# Patient Record
Sex: FEMALE | Race: WHITE | Employment: FULL TIME | URBAN - METROPOLITAN AREA
[De-identification: names, ages, dates, MRNs, and addresses within clinical notes are randomized per-mention and may not be internally consistent; named-entity substitution may affect disease eponyms.]

---

## 2017-01-13 LAB
AMPHETAMINE SCREEN, URINE: NORMAL
BARBITURATE SCREEN URINE: NORMAL
BENZODIAZEPINE SCREEN, URINE: NORMAL
CANNABINOID SCREEN URINE: NORMAL
COCAINE METABOLITE SCREEN URINE: NORMAL
Lab: NORMAL
METHADONE SCREEN, URINE: NORMAL
OPIATE SCREEN URINE: NORMAL
OXYCODONE URINE: NORMAL
PH UA: 5
PHENCYCLIDINE SCREEN URINE: NORMAL
PROPOXYPHENE SCREEN: NORMAL

## 2017-01-27 LAB
GLUCOSE CHALLENGE: 111 MG/DL
HCT VFR BLD CALC: 36.4 % (ref 36–48)
HEMOGLOBIN: 11.8 G/DL (ref 12–16)
MCH RBC QN AUTO: 29 PG (ref 26–34)
MCHC RBC AUTO-ENTMCNC: 32.5 G/DL (ref 31–36)
MCV RBC AUTO: 89.3 FL (ref 80–100)
PDW BLD-RTO: 14.4 % (ref 12.4–15.4)
PLATELET # BLD: 179 K/UL (ref 135–450)
PMV BLD AUTO: 10.6 FL (ref 5–10.5)
RBC # BLD: 4.07 M/UL (ref 4–5.2)
WBC # BLD: 7.8 K/UL (ref 4–11)

## 2017-03-23 PROBLEM — Z98.891 S/P CESAREAN SECTION: Status: ACTIVE | Noted: 2017-03-23

## 2019-12-14 ENCOUNTER — APPOINTMENT (OUTPATIENT)
Dept: GENERAL RADIOLOGY | Age: 29
End: 2019-12-14
Payer: COMMERCIAL

## 2019-12-14 ENCOUNTER — HOSPITAL ENCOUNTER (EMERGENCY)
Age: 29
Discharge: HOME | End: 2019-12-14
Attending: EMERGENCY MEDICINE
Payer: COMMERCIAL

## 2019-12-14 VITALS
RESPIRATION RATE: 20 BRPM | SYSTOLIC BLOOD PRESSURE: 117 MMHG | TEMPERATURE: 98.9 F | BODY MASS INDEX: 43.03 KG/M2 | OXYGEN SATURATION: 98 % | DIASTOLIC BLOOD PRESSURE: 78 MMHG | WEIGHT: 258.6 LBS | HEART RATE: 95 BPM

## 2019-12-14 DIAGNOSIS — J20.9 ACUTE BRONCHITIS, UNSPECIFIED ORGANISM: Primary | ICD-10-CM

## 2019-12-14 DIAGNOSIS — J98.9 REACTIVE AIRWAY DISEASE THAT IS NOT ASTHMA: ICD-10-CM

## 2019-12-14 PROCEDURE — 6360000002 HC RX W HCPCS: Performed by: EMERGENCY MEDICINE

## 2019-12-14 PROCEDURE — 71046 X-RAY EXAM CHEST 2 VIEWS: CPT

## 2019-12-14 PROCEDURE — 99283 EMERGENCY DEPT VISIT LOW MDM: CPT

## 2019-12-14 RX ORDER — PREDNISONE 20 MG/1
40 TABLET ORAL DAILY
Qty: 10 TABLET | Refills: 0 | Status: SHIPPED | OUTPATIENT
Start: 2019-12-14 | End: 2019-12-19

## 2019-12-14 RX ORDER — AZITHROMYCIN 250 MG/1
250 TABLET, FILM COATED ORAL SEE ADMIN INSTRUCTIONS
Qty: 6 TABLET | Refills: 0 | Status: SHIPPED | OUTPATIENT
Start: 2019-12-14 | End: 2019-12-19

## 2019-12-14 RX ORDER — ALBUTEROL SULFATE 90 UG/1
2 AEROSOL, METERED RESPIRATORY (INHALATION) 4 TIMES DAILY PRN
Qty: 1 INHALER | Refills: 0 | Status: SHIPPED | OUTPATIENT
Start: 2019-12-14 | End: 2022-10-29

## 2019-12-14 RX ORDER — ALBUTEROL SULFATE 2.5 MG/3ML
2.5 SOLUTION RESPIRATORY (INHALATION) ONCE
Status: COMPLETED | OUTPATIENT
Start: 2019-12-14 | End: 2019-12-14

## 2019-12-14 RX ADMIN — ALBUTEROL SULFATE 2.5 MG: 2.5 SOLUTION RESPIRATORY (INHALATION) at 18:30

## 2020-07-31 ENCOUNTER — APPOINTMENT (OUTPATIENT)
Dept: GENERAL RADIOLOGY | Age: 30
End: 2020-07-31
Payer: COMMERCIAL

## 2020-07-31 ENCOUNTER — HOSPITAL ENCOUNTER (EMERGENCY)
Age: 30
Discharge: HOME | End: 2020-07-31
Attending: EMERGENCY MEDICINE
Payer: COMMERCIAL

## 2020-07-31 VITALS
TEMPERATURE: 98.9 F | HEART RATE: 81 BPM | BODY MASS INDEX: 44.98 KG/M2 | HEIGHT: 65 IN | OXYGEN SATURATION: 95 % | DIASTOLIC BLOOD PRESSURE: 89 MMHG | WEIGHT: 270 LBS | SYSTOLIC BLOOD PRESSURE: 145 MMHG | RESPIRATION RATE: 16 BRPM

## 2020-07-31 LAB
BILIRUBIN URINE: NEGATIVE
BLOOD, URINE: NEGATIVE
CLARITY: CLEAR
COLOR: YELLOW
GLUCOSE URINE: NEGATIVE MG/DL
KETONES, URINE: NEGATIVE MG/DL
LEUKOCYTE ESTERASE, URINE: NEGATIVE
MICROSCOPIC EXAMINATION: NORMAL
NITRITE, URINE: NEGATIVE
PH UA: 6 (ref 5–8)
PROTEIN UA: NEGATIVE MG/DL
S PYO AG THROAT QL: NEGATIVE
SPECIFIC GRAVITY UA: 1.01 (ref 1–1.03)
URINE REFLEX TO CULTURE: NORMAL
URINE TYPE: NORMAL
UROBILINOGEN, URINE: 0.2 E.U./DL

## 2020-07-31 PROCEDURE — 70360 X-RAY EXAM OF NECK: CPT

## 2020-07-31 PROCEDURE — 87880 STREP A ASSAY W/OPTIC: CPT

## 2020-07-31 PROCEDURE — 87081 CULTURE SCREEN ONLY: CPT

## 2020-07-31 PROCEDURE — 99284 EMERGENCY DEPT VISIT MOD MDM: CPT

## 2020-07-31 PROCEDURE — 81003 URINALYSIS AUTO W/O SCOPE: CPT

## 2020-07-31 RX ORDER — CEPHALEXIN 500 MG/1
500 CAPSULE ORAL 3 TIMES DAILY
Qty: 30 CAPSULE | Refills: 0 | Status: SHIPPED | OUTPATIENT
Start: 2020-07-31 | End: 2020-08-10

## 2020-07-31 ASSESSMENT — ENCOUNTER SYMPTOMS
NAUSEA: 0
DIARRHEA: 0
VOMITING: 0
SHORTNESS OF BREATH: 0
BACK PAIN: 0
ABDOMINAL PAIN: 0
RHINORRHEA: 0
EYE PAIN: 0
EYE DISCHARGE: 0
SORE THROAT: 1
COUGH: 0
WHEEZING: 0

## 2020-07-31 ASSESSMENT — PAIN DESCRIPTION - FREQUENCY: FREQUENCY: CONTINUOUS

## 2020-07-31 ASSESSMENT — PAIN DESCRIPTION - PAIN TYPE: TYPE: ACUTE PAIN

## 2020-07-31 ASSESSMENT — PAIN DESCRIPTION - PROGRESSION: CLINICAL_PROGRESSION: GRADUALLY WORSENING

## 2020-07-31 ASSESSMENT — PAIN SCALES - GENERAL
PAINLEVEL_OUTOF10: 4
PAINLEVEL_OUTOF10: 0
PAINLEVEL_OUTOF10: 0

## 2020-07-31 ASSESSMENT — PAIN DESCRIPTION - LOCATION: LOCATION: THROAT

## 2020-07-31 ASSESSMENT — PAIN DESCRIPTION - ONSET: ONSET: SUDDEN

## 2020-07-31 ASSESSMENT — PAIN DESCRIPTION - DESCRIPTORS: DESCRIPTORS: ACHING

## 2020-07-31 ASSESSMENT — PAIN - FUNCTIONAL ASSESSMENT: PAIN_FUNCTIONAL_ASSESSMENT: ACTIVITIES ARE NOT PREVENTED

## 2020-07-31 ASSESSMENT — PAIN DESCRIPTION - ORIENTATION: ORIENTATION: RIGHT

## 2020-07-31 NOTE — ED TRIAGE NOTES
Patient arrived ambulatory. C/o sore throat on right side X 40 mins ago. Reports she ate some beef jerky and since then feels like there might be something stuck. Sore to swallow. No respiratory distress. A/ox4. Will continue to monitor.

## 2020-07-31 NOTE — ED PROVIDER NOTES
[facility]  eMERGENCY dEPARTMENT eNCOUnter        Pt Name: Ashley Ricks  MRN: [id number]  [place] 1990  Date of evaluation: 7/31/2020  Provider: Napoleon Pedraza MD  PCP: [facility]      CHIEF COMPLAINT       Chief Complaint   Patient presents with    Pharyngitis     feels like something stuck in throat X 40 mins, hurts to swallow       HISTORY OFPRESENT ILLNESS   (Location/Symptom, Timing/Onset, Context/Setting, Quality, Duration, Modifying Factors,Severity)  Note limiting factors. Ashley Ricks is a 34 y.o. female       Location/Symptom: Foreign body sensation in her throat  Timing/Onset: 40 minutes. Context/Setting: Patient was eating some beef jerky when she developed a sensation. Quality: Complaining of a sharp pain. Duration: Again this is only been going on about an hour. Modifying Factors: She is not having any difficulty handling her secretions. There is no change in her voice. She does note that she has been very fatigued lately. She is noticed a little pain in her right ear. She is very worried that she has some type of cancer because she used to smoke. Nursing Noteswere all reviewed and agreed with or any disagreements were addressed  in the HPI. REVIEW OF SYSTEMS    (2-9 systems for level 4, 10 or more for level 5)     Review of Systems   Constitutional: Positive for fatigue. Negative for chills and fever. HENT: Positive for ear pain and sore throat. Negative for rhinorrhea. Eyes: Negative for pain, discharge and visual disturbance. Respiratory: Negative for cough, shortness of breath and wheezing. Cardiovascular: Negative for chest pain, palpitations and leg swelling. Gastrointestinal: Negative for abdominal pain, diarrhea, nausea and vomiting. Genitourinary: Negative for difficulty urinating, dysuria, pelvic pain and vaginal discharge.    Musculoskeletal: Negative for arthralgias, back pain, joint swelling and neck pain. Skin: Negative for rash. Allergic/Immunologic: Negative for environmental allergies. Neurological: Negative for dizziness, seizures, syncope and headaches. Hematological: Negative for adenopathy. Psychiatric/Behavioral: Negative for dysphoric mood and suicidal ideas. The patient is not nervous/anxious. PAST MEDICAL HISTORY     Past Medical History:   Diagnosis Date    Bacterial vaginosis 2015    Chronic kidney disease     reoccuring UTI and BV with preg.   Pylo 16    HSV-2 (herpes simplex virus 2) infection     last outbreak  did not take Valtrex at that time  taking Valtrex now    Postpartum depression     no meds         SURGICAL HISTORY     Past Surgical History:   Procedure Laterality Date     SECTION      TONSILLECTOMY      WISDOM TOOTH EXTRACTION           CURRENTMEDICATIONS       Previous Medications    ALBUTEROL SULFATE  (90 BASE) MCG/ACT INHALER    Inhale 2 puffs into the lungs 4 times daily as needed for Wheezing    LEVONORGESTREL (MIRENA, 52 MG, IU)    by Intrauterine route    SPACER/AERO-HOLDING CHAMBERS EPHRAIM    1 Device by Does not apply route daily as needed (with inhaler)       ALLERGIES     Norco [hydrocodone-acetaminophen]    FAMILY HISTORY       Family History   Problem Relation Age of Onset    Heart Disease Maternal Grandfather     Substance Abuse Maternal Grandfather         alcoholic     Heart Disease Paternal Grandfather     High Cholesterol Mother     High Blood Pressure Mother     Depression Mother     Diabetes Father     High Cholesterol Father     Arthritis Sister     Cancer Maternal Grandmother     Heart Disease Maternal Grandmother     High Blood Pressure Maternal Grandmother     Diabetes Paternal Grandmother     Heart Disease Paternal Grandmother         50's heart attack          SOCIAL HISTORY       Social History     Socioeconomic History    Marital status: Single     Spouse name: None  Number of children: None    Years of education: None    Highest education level: None   Occupational History    None   Social Needs    Financial resource strain: None    Food insecurity     Worry: None     Inability: None    Transportation needs     Medical: None     Non-medical: None   Tobacco Use    Smoking status: Current Every Day Smoker     Packs/day: 0.50     Types: Cigarettes    Smokeless tobacco: Never Used   Substance and Sexual Activity    Alcohol use: No    Drug use: No    Sexual activity: Yes     Partners: Male   Lifestyle    Physical activity     Days per week: None     Minutes per session: None    Stress: None   Relationships    Social connections     Talks on phone: None     Gets together: None     Attends [employer] service: None     Active member of club or organization: None     Attends meetings of clubs or organizations: None     Relationship status: None    Intimate partner violence     Fear of current or ex partner: None     Emotionally abused: None     Physically abused: None     Forced sexual activity: None   Other Topics Concern    None   Social History Narrative    None       SCREENINGS             PHYSICAL EXAM    (up to 7 for level 4, 8 or more for level 5)     ED Triage Vitals   BP Temp Temp Source Pulse Resp SpO2 Height Weight   07/31/20 1838 07/31/20 1838 07/31/20 1838 07/31/20 1838 07/31/20 1842 07/31/20 1838 07/31/20 1838 07/31/20 1838   (!) 145/89 98.9 °F (37.2 °C) Oral 80 16 95 % 5' 5\" (1.651 m) 270 lb (122.5 kg)      height is 5' 5\" (1.651 m) and weight is 270 lb (122.5 kg). Her oral temperature is 98.9 °F (37.2 °C). Her blood pressure is 145/89 (abnormal) and her pulse is 81. Her respiration is 16 and oxygen saturation is 95%. Physical Exam  Vitals signs and nursing note reviewed. Constitutional:       Appearance: She is well-developed. She is not diaphoretic. HENT:      Head: Normocephalic and atraumatic.       Right Ear: External ear normal.      Left Ear: External ear normal.      Ears:      Comments: There is just a little bit of erythema of the right tympanic membrane but I do not see otitis externa and I do not see an air-fluid level or obvious otitis media     Mouth/Throat:      Pharynx: Oropharynx is clear. Uvula midline. No pharyngeal swelling, oropharyngeal exudate, posterior oropharyngeal erythema or uvula swelling. Tonsils: 0 on the right. 0 on the left. Eyes:      General: No scleral icterus. Right eye: No discharge. Left eye: No discharge. Conjunctiva/sclera: Conjunctivae normal.   Neck:      Musculoskeletal: Normal range of motion. Trachea: No tracheal deviation. Pulmonary:      Effort: Pulmonary effort is normal. No respiratory distress. Breath sounds: No stridor. Musculoskeletal: Normal range of motion. Skin:     General: Skin is warm and dry. Neurological:      Mental Status: She is alert and oriented to person, place, and time.       Coordination: Coordination normal.   Psychiatric:         Behavior: Behavior normal.          DIAGNOSTIC RESULTS   LABS:    Results for orders placed or performed during the hospital encounter of 07/31/20   Strep Screen Group A Throat    Specimen: Throat   Result Value Ref Range    Rapid Strep A Screen Negative Negative   Urinalysis Reflex to Culture    Specimen: Urine, clean catch   Result Value Ref Range    Color, UA Yellow Straw/Yellow    Clarity, UA Clear Clear    Glucose, Ur Negative Negative mg/dL    Bilirubin Urine Negative Negative    Ketones, Urine Negative Negative mg/dL    Specific Gravity, UA 1.015 1.005 - 1.030    Blood, Urine Negative Negative    pH, UA 6.0 5.0 - 8.0    Protein, UA Negative Negative mg/dL    Urobilinogen, Urine 0.2 <2.0 E.U./dL    Nitrite, Urine Negative Negative    Leukocyte Esterase, Urine Negative Negative    Microscopic Examination Not Indicated     Urine Type NotGiven     Urine Reflex to Culture Not Indicated        All other labs were within normal range or not returned as of this dictation. EKG: All EKG's are interpreted by the Emergency Department Physician who either signs orCo-signs this chart in the absence of a cardiologist.    None    RADIOLOGY:   Non-plain film images such as CT, Ultrasound and MRI are read by the radiologist. Ioana Ojeda radiographic images are visualized and preliminarily interpreted by the  EDProvider with the below findings:    Xr Neck Soft Tissue    Result Date: 7/31/2020  EXAMINATION: TWO XRAY VIEWS OF THE NECK SOFT TISSUES 7/31/2020 4:17 pm COMPARISON: None. HISTORY: ORDERING SYSTEM PROVIDED HISTORY: foreign body sensation TECHNOLOGIST PROVIDED HISTORY: Reason for exam:->foreign body sensation Reason for Exam: Feels like something stuck in throat x 1 hr Acuity: Acute Mechanism of Injury: Was eating beef jerky, hurts to swallow. Relevant Medical/Surgical History: Sx tonsillectomy. FINDINGS: Prevertebral soft tissues are unremarkable. The epiglottis and aryepiglottic folds are well defined. No foreign bodies are identified. On the AP examination, the subglottic trachea is unremarkable in appearance. The visualized lungs are clear. No acute abnormality identified. No foreign body is detected. Note that beef jerky would likely not be detectable with radiography. PROCEDURES   Unless otherwise noted below, none     Procedures    CRITICAL CARE TIME   N/A    CONSULTS:  None    EMERGENCY DEPARTMENT COURSE and DIFFERENTIAL DIAGNOSIS/MDM:   Vitals:    Vitals:    07/31/20 1838 07/31/20 1842   BP: (!) 145/89 (!) 145/89   Pulse: 80 81   Resp:  16   Temp: 98.9 °F (37.2 °C) 98.9 °F (37.2 °C)   TempSrc: Oral Oral   SpO2: 95% 95%   Weight: 270 lb (122.5 kg) 270 lb (122.5 kg)   Height: 5' 5\" (1.651 m) 5' 5\" (1.651 m)       Patient was given the following medications:  Medications - No data to display    Patient had various concerns. She was worried that she had some type of cancer.   I did explain to her that the likelihood a soft tissue x-ray would show the beef jerky but I did do one just to somewhat reassure that I did not see any obvious mass. While she was here she states that she stuck her finger back behind her soft palate and did get some kind of mucoid green material that smelled bad. So, I gave her an antibiotic but told her to wait until tomorrow to see if all this goes away and that she should just try gargling to see if this comes out. Her mother wanted me to draw some blood to check vitamin levels which of course I cannot do here in the department but I did offer to do a CBC and chemistry but the patient declined. We did do a urinalysis which was normal.      FINAL IMPRESSION      1.  Foreign body sensation in throat          DISPOSITION/PLAN    DISPOSITION Decision To Discharge 07/31/2020 08:01:29 PM      PATIENT REFERRED TO:  JHOAN Elizalde [address]  [address] [place] [address] [address])  [address] [place] [address]  [phone]      This is our ENT specialist at [address] [place] [address].:  New Prescriptions    CEPHALEXIN (KEFLEX) 500 MG CAPSULE    Take 1 capsule by mouth 3 times daily for 10 days       DISCONTINUED MEDICATIONS:  Discontinued Medications    No medications on file              (Please note that portions of this note were completed with a voice recognition program.  Efforts were made to editthe dictations but occasionally words are mis-transcribed.)    Breana Greene MD (electronically signed)           Breana Greene MD  07/31/20 2008

## 2020-08-01 ENCOUNTER — CARE COORDINATION (OUTPATIENT)
Dept: CARE COORDINATION | Age: 30
End: 2020-08-01

## 2020-08-01 NOTE — ED NOTES
Pt discharged at this time. Discharge instructions and medications reviewed,  Questions were answered. PT verbalized understanding. VSS, Afebrile. Follow up appointments were discussed.          Joshua Guzman RN  07/31/20 2003

## 2020-08-03 LAB — S PYO THROAT QL CULT: NORMAL

## 2020-08-04 ENCOUNTER — TELEPHONE (OUTPATIENT)
Dept: ENT CLINIC | Age: 30
End: 2020-08-04

## 2020-08-04 NOTE — TELEPHONE ENCOUNTER
----- Message from Patti Lopez MD sent at 8/3/2020  8:28 AM EDT -----  Regarding: appointment  I would like to see in the next 2 weeks.     Thanks,  Ashley Alejandra

## 2021-01-13 ENCOUNTER — HOSPITAL ENCOUNTER (EMERGENCY)
Age: 31
Discharge: HOME | End: 2021-01-13
Attending: EMERGENCY MEDICINE
Payer: COMMERCIAL

## 2021-01-13 VITALS
OXYGEN SATURATION: 98 % | HEIGHT: 65 IN | WEIGHT: 267.6 LBS | HEART RATE: 90 BPM | SYSTOLIC BLOOD PRESSURE: 132 MMHG | RESPIRATION RATE: 14 BRPM | BODY MASS INDEX: 44.58 KG/M2 | DIASTOLIC BLOOD PRESSURE: 86 MMHG | TEMPERATURE: 98.1 F

## 2021-01-13 DIAGNOSIS — H60.332 ACUTE SWIMMER'S EAR OF LEFT SIDE: Primary | ICD-10-CM

## 2021-01-13 PROCEDURE — 99283 EMERGENCY DEPT VISIT LOW MDM: CPT

## 2021-01-13 RX ORDER — NEOMYCIN SULFATE, POLYMYXIN B SULFATE AND HYDROCORTISONE 10; 3.5; 1 MG/ML; MG/ML; [USP'U]/ML
3 SUSPENSION/ DROPS AURICULAR (OTIC) 4 TIMES DAILY
Qty: 10 ML | Refills: 0 | Status: SHIPPED | OUTPATIENT
Start: 2021-01-13 | End: 2021-01-23

## 2021-01-13 ASSESSMENT — PAIN DESCRIPTION - PAIN TYPE: TYPE: ACUTE PAIN

## 2021-01-13 ASSESSMENT — PAIN SCALES - GENERAL
PAINLEVEL_OUTOF10: 2
PAINLEVEL_OUTOF10: 2

## 2021-01-13 ASSESSMENT — PAIN DESCRIPTION - LOCATION: LOCATION: EAR

## 2021-01-13 ASSESSMENT — PAIN DESCRIPTION - ORIENTATION: ORIENTATION: LEFT

## 2021-01-13 ASSESSMENT — PAIN DESCRIPTION - FREQUENCY: FREQUENCY: CONTINUOUS

## 2021-01-13 NOTE — ED PROVIDER NOTES
[facility]  eMERGENCY dEPARTMENT eNCOUnter      Pt Name: Forest Vega  MRN: [id number]  [place] 1990  Date of evaluation: 2021  Provider: Verner Laster, MD    [address] [place] [address]       Chief Complaint   Patient presents with   Beverlie Reading     left ear ache started 2 days ago          HISTORY OF PRESENT ILLNESS  (Location/Symptom, Timing/Onset, Context/Setting, Quality, Duration, Modifying Factors, Severity.)   Forest Vega is a 27 y.o. female who presents to the emergency department complaining of left ear pain for 2 days. He states it is sore to touch. Hurts when she opens and closes her mouth. No ear drainage. No rhinorrhea, sore throat or cough. She did get her coronavirus vaccine yesterday. Her ear discomfort started the day before. Nursing Notes were reviewed and I agree. REVIEW OF SYSTEMS    (2-9 systems for level 4, 10 or more for level 5)     Dental: No fever or chills. Eyes: No redness or discharge. ENT: Left ear pain as above. No drainage. No nasal congestion or sore throat. Respiratory: No cough. Except as noted above the remainder of the review of systems was reviewed and negative. PAST MEDICAL HISTORY         Diagnosis Date    Bacterial vaginosis 2015    Chronic kidney disease     reoccuring UTI and BV with preg.   Pylo 16    HSV-2 (herpes simplex virus 2) infection     last outbreak  did not take Valtrex at that time  taking Valtrex now    Postpartum depression     no meds       SURGICAL HISTORY           Procedure Laterality Date     SECTION      TONSILLECTOMY      WISDOM TOOTH EXTRACTION         CURRENT MEDICATIONS       Previous Medications    ALBUTEROL SULFATE  (90 BASE) MCG/ACT INHALER    Inhale 2 puffs into the lungs 4 times daily as needed for Wheezing    LEVONORGESTREL (MIRENA, 52 MG, IU)    by Intrauterine route    SPACER/AERO-HOLDING CHAMBERS EPHRAIM    1 Device by Does not apply route daily as needed (with inhaler)       ALLERGIES     Norco [hydrocodone-acetaminophen]    FAMILY HISTORY           Problem Relation Age of Onset    Heart Disease Maternal Grandfather     Substance Abuse Maternal Grandfather         alcoholic     Heart Disease Paternal Grandfather     High Cholesterol Mother     High Blood Pressure Mother     Depression Mother     Diabetes Father     High Cholesterol Father     Arthritis Sister     Cancer Maternal Grandmother     Heart Disease Maternal Grandmother     High Blood Pressure Maternal Grandmother     Diabetes Paternal Grandmother     Heart Disease Paternal Grandmother         52's heart attack     Family Status   Relation Name Status    MGF      PGF      Mother  (Not Specified)    Father  (Not Specified)    Sister  (Not Specified)    MGM  (Not Specified)    PGM  (Not Specified)        SOCIAL HISTORY      reports that she has been smoking cigarettes. She has been smoking about 0.50 packs per day. She has never used smokeless tobacco. She reports that she does not drink alcohol or use drugs. PHYSICAL EXAM    (up to 7 for level 4, 8 or more for level 5)     ED Triage Vitals [21 1337]   BP Temp Temp Source Pulse Resp SpO2 Height Weight   132/86 98.1 °F (36.7 °C) Oral 96 14 98 % 5' 5\" (1.651 m) 267 lb 9.6 oz (121.4 kg)       General: Alert white female no acute distress. Head: Atraumatic and normocephalic. Eyes: No conjunctival injection. No discharge. Pupils equal round reactive. ENT: Left ear canal is red and slightly swollen. The tragus is tender. TM is clear without erythema or dullness. Right TM and ear canal are normal.  There is no cerumen in the ear canals. Nose is clear without rhinorrhea. Oropharynx moist without erythema. Neck: Supple without adenopathy, nontender. Heart: Regular rate and rhythm. No murmurs or gallops noted. Lungs: Breath sounds equal bilaterally and clear.       DIAGNOSTIC RESULTS     RADIOLOGY:   Non-plain film images such as CT, Ultrasound and MRI are read by the radiologist. Plain radiographic images are visualized and preliminarily interpreted by Robert Pickard MD with the below findings:        Interpretation per the Radiologist below, if available at the time of this note:    No orders to display       LABS:  Labs Reviewed - No data to display    All other labs were within normal range or not returned as of this dictation. EMERGENCY DEPARTMENT COURSE and DIFFERENTIAL DIAGNOSIS/MDM:   Vitals:    Vitals:    01/13/21 1337   BP: 132/86   Pulse: 96   Resp: 14   Temp: 98.1 °F (36.7 °C)   TempSrc: Oral   SpO2: 98%   Weight: 267 lb 9.6 oz (121.4 kg)   Height: 5' 5\" (1.651 m)       Patient has a otitis externa. She has some mild erythema of the ear canal.  The TM is normal.  I do not think she has an otitis media. Her intraoral exam is normal.  Her teeth are in good repair. I do not see any dental caries or dental abscess or other potential source for her ear pain. She will be put on Cortisporin otic suspension with recommendations to follow-up in her primary care physician if not improved. Return here for worsening of symptoms or new symptoms of concern. Diagnosis and treatment plan were discussed with the patient. She understands the treatment plan and follow-up as discussed. PROCEDURES:  None    FINAL IMPRESSION      1.  Acute swimmer's ear of left side          DISPOSITION/PLAN   DISPOSITION Decision To Discharge 01/13/2021 01:45:40 PM      PATIENT REFERRED TO:  JHOAN Claire [facility] [address] [facility] [address]  [phone]    In 1 week  If symptoms worsen      DISCHARGE MEDICATIONS:  New Prescriptions    NEOMYCIN-POLYMYXIN-HYDROCORTISONE (CORTISPORIN) [id number] OTIC SUSPENSION    Place 3 drops into the left ear 4 times daily for 10 days       (Please note that portions of this note were completed with a voice recognition program.  Efforts were made to edit the dictations but occasionally words are mis-transcribed.)    Toan Payne MD  Attending Emergency Physician        Caren Sandoval MD  01/13/21 [id number]

## 2022-05-29 ENCOUNTER — HOSPITAL ENCOUNTER (EMERGENCY)
Age: 32
Discharge: HOME | End: 2022-05-29
Attending: EMERGENCY MEDICINE
Payer: COMMERCIAL

## 2022-05-29 VITALS
OXYGEN SATURATION: 98 % | TEMPERATURE: 98 F | WEIGHT: 274.47 LBS | BODY MASS INDEX: 45.67 KG/M2 | DIASTOLIC BLOOD PRESSURE: 95 MMHG | RESPIRATION RATE: 18 BRPM | SYSTOLIC BLOOD PRESSURE: 142 MMHG | HEART RATE: 85 BPM

## 2022-05-29 DIAGNOSIS — H60.392 INFECTIVE OTITIS EXTERNA OF LEFT EAR: Primary | ICD-10-CM

## 2022-05-29 PROCEDURE — 99283 EMERGENCY DEPT VISIT LOW MDM: CPT

## 2022-05-29 PROCEDURE — 6370000000 HC RX 637 (ALT 250 FOR IP): Performed by: EMERGENCY MEDICINE

## 2022-05-29 RX ORDER — DEXAMETHASONE SODIUM PHOSPHATE 1 MG/ML
4 SOLUTION/ DROPS OPHTHALMIC ONCE
Status: COMPLETED | OUTPATIENT
Start: 2022-05-29 | End: 2022-05-29

## 2022-05-29 RX ORDER — CIPROFLOXACIN HYDROCHLORIDE 3.5 MG/ML
4 SOLUTION/ DROPS TOPICAL ONCE
Status: COMPLETED | OUTPATIENT
Start: 2022-05-29 | End: 2022-05-29

## 2022-05-29 RX ORDER — CIPROFLOXACIN AND DEXAMETHASONE 3; 1 MG/ML; MG/ML
4 SUSPENSION/ DROPS AURICULAR (OTIC) 2 TIMES DAILY
Qty: 1 EACH | Refills: 0 | Status: SHIPPED | OUTPATIENT
Start: 2022-05-29 | End: 2022-06-05

## 2022-05-29 RX ORDER — TETRACAINE HYDROCHLORIDE 5 MG/ML
2 SOLUTION OPHTHALMIC ONCE
Status: COMPLETED | OUTPATIENT
Start: 2022-05-29 | End: 2022-05-29

## 2022-05-29 RX ORDER — CIPROFLOXACIN AND DEXAMETHASONE 3; 1 MG/ML; MG/ML
4 SUSPENSION/ DROPS AURICULAR (OTIC) ONCE
Status: DISCONTINUED | OUTPATIENT
Start: 2022-05-29 | End: 2022-05-29 | Stop reason: CLARIF

## 2022-05-29 RX ADMIN — TETRACAINE HYDROCHLORIDE 2 DROP: 5 SOLUTION OPHTHALMIC at 01:25

## 2022-05-29 RX ADMIN — DEXAMETHASONE SODIUM PHOSPHATE 4 DROP: 1 SOLUTION/ DROPS OPHTHALMIC at 01:36

## 2022-05-29 RX ADMIN — CIPROFLOXACIN 4 DROP: 3 SOLUTION OPHTHALMIC at 01:36

## 2022-05-29 ASSESSMENT — PAIN SCALES - GENERAL
PAINLEVEL_OUTOF10: 5
PAINLEVEL_OUTOF10: 5

## 2022-05-29 ASSESSMENT — PAIN DESCRIPTION - PAIN TYPE
TYPE: ACUTE PAIN
TYPE: ACUTE PAIN

## 2022-05-29 ASSESSMENT — PAIN DESCRIPTION - ONSET: ONSET: AWAKENED FROM SLEEP

## 2022-05-29 ASSESSMENT — PAIN DESCRIPTION - FREQUENCY
FREQUENCY: CONTINUOUS
FREQUENCY: CONTINUOUS

## 2022-05-29 ASSESSMENT — ENCOUNTER SYMPTOMS
RHINORRHEA: 0
SORE THROAT: 0
COUGH: 0

## 2022-05-29 ASSESSMENT — PAIN DESCRIPTION - LOCATION
LOCATION: EAR
LOCATION: EAR

## 2022-05-29 ASSESSMENT — PAIN DESCRIPTION - DESCRIPTORS
DESCRIPTORS: ACHING
DESCRIPTORS: ACHING

## 2022-05-29 ASSESSMENT — PAIN DESCRIPTION - ORIENTATION
ORIENTATION: LEFT
ORIENTATION: LEFT

## 2022-05-29 NOTE — ED TRIAGE NOTES
Pt drove herself to ED tonight to be seen for Left ear pain she rates a 9/10. Pt states that she has a Hx of ear infections and uses ear washes frequently. Pt uses q tips to clean ears. Pt presents on room air and in no signs of acute distress. Goals, fall prevention and safety have been reviewed with the pt who acknowledges understanding. Call light in reach. No further questions or needs made known at this time.

## 2022-05-29 NOTE — ED PROVIDER NOTES
Emergency Department Provider Note  Location: [facility]  2022     Patient Identification  Tayler Payne is a 32 y.o. female    Chief Complaint  Otalgia (Left ear pain since . 9/10 pain. Pt took 500mg tylenol ER, 500mg Rapid release, and 500 mg Nopraxen PO  \"20 Minutes ago. \")      Mode of Arrival  private car    HPI  (History provided by patient)  This is a 32 y.o. female presented today for left ear pain that started yesterday. She rates the pain 9/10 in intensity. No fever or other URI symptoms. She took 2 doses of tylenol today for the pain without relief. She also took a dose of naproxen just before coming to our ED. She rates her pain 9/10 in intensity. Patient endorses using q-tips to clean her ears. Denies any FB may be in her ear. ROS  Review of Systems   Constitutional: Negative for fever. HENT: Positive for ear pain. Negative for congestion, rhinorrhea and sore throat. Respiratory: Negative for cough. I have reviewed the following nursing documentation:  Allergies: Allergies   Allergen Reactions    Norco [Hydrocodone-Acetaminophen]        Past medical history:  has a past medical history of Bacterial vaginosis (2015), Chronic kidney disease, HSV-2 (herpes simplex virus 2) infection (), and Postpartum depression. Past surgical history:  has a past surgical history that includes Tonsillectomy; Hazel tooth extraction; and  section. Home medications:   Prior to Admission medications    Medication Sig Start Date End Date Taking?  Authorizing Provider   Spacer/Aero-Holding Humera Chongncer 1 Device by Does not apply route daily as needed (with inhaler)  Patient not taking: Reported on 19   RASHARD Barahona DO   albuterol sulfate  (90 Base) MCG/ACT inhaler Inhale 2 puffs into the lungs 4 times daily as needed for Wheezing  Patient not taking: Reported on 19   [address] [facility] [place], DO Levonorgestrel (MIRENA, 52 MG, IU) by Intrauterine route    Historical Provider, MD       Social history:  reports that she has quit smoking. Her smoking use included cigarettes. She smoked 0.50 packs per day. She has never used smokeless tobacco. She reports that she does not drink alcohol and does not use drugs. Family history:    Family History   Problem Relation Age of Onset    Heart Disease Maternal Grandfather     Substance Abuse Maternal Grandfather         alcoholic     Heart Disease Paternal Grandfather     High Cholesterol Mother     High Blood Pressure Mother     Depression Mother     Diabetes Father     High Cholesterol Father     Arthritis Sister     Cancer Maternal Grandmother     Heart Disease Maternal Grandmother     High Blood Pressure Maternal Grandmother     Diabetes Paternal Grandmother     Heart Disease Paternal Grandmother         52's heart attack       Exam  ED Triage Vitals [05/29/22 0133]   BP Temp Temp Source Heart Rate Resp SpO2 Height Weight   (!) 142/95 98 °F (36.7 °C) Tympanic 85 18 98 % -- 274 lb 7.6 oz (124.5 kg)   Physical Exam  Vitals and nursing note reviewed. Constitutional:       General: She is not in acute distress. Appearance: Normal appearance. She is well-developed. She is not diaphoretic. HENT:      Head: Normocephalic and atraumatic. Right Ear: Tympanic membrane and ear canal normal.      Left Ear: Drainage and swelling (of the EAC) present. No foreign body. No mastoid tenderness. Nose: Nose normal.   Eyes:      General: No scleral icterus. Right eye: No discharge. Left eye: No discharge. Pupils: Pupils are equal, round, and reactive to light. Neck:      Trachea: No tracheal deviation. Pulmonary:      Effort: Pulmonary effort is normal. No respiratory distress. Breath sounds: No stridor. Skin:     General: Skin is warm and dry. Coloration: Skin is not pale.    Neurological:      Mental Status: She is alert and oriented to person, place, and time. Cranial Nerves: No dysarthria or facial asymmetry. Gait: Gait normal.   Psychiatric:         Mood and Affect: Mood normal.         Behavior: Behavior normal.               MDM/ED Course  ED Medication Orders (From admission, onward)    Start Ordered     Status Ordering Provider    05/29/22 0145 05/29/22 0128  ciprofloxacin (CILOXAN) 0.3 % ophthalmic solution 4 drop  ONCE        \"And\" Linked Group Details    Last MAR action: Given - by STEVE BENSON on 05/29/22 at [facility]    05/29/22 0145 05/29/22 0128  dexamethasone (DECADRON) 0.1 % ophthalmic solution 4 drop  ONCE        Note to Pharmacy: Administer 30 minutes after ciprofloxacin 0.3% drops have been given. \"And\" Linked Group Details    Last MAR action: Given - by STEVE BENSON on 05/29/22 at [facility]    05/29/22 0130 05/29/22 0120  tetracaine (TETRAVISC) 0.5 % ophthalmic solution 2 drop  ONCE         Last MAR action: Given - by Ashleigh BENSON on 05/29/22 at [place], [place] [facility]          - Patient seen and evaluated in room 5.  32 y.o. female presented for left ear pain. Exam consistent with otitis externa. No FB.   - I placed a ear wick and put in 2 drops of tetracaine for pain control. We also gave the patient 4 drops of ciprodex. - home care instruction given. Will refer to ENT for f/u  - Return precautions also discussed. patient verbalized understanding of care plan and agreed to follow-up with ENT as advised. I estimate there is LOW risk for MASTOIDITIS, MENINGITIS, DEEP SPACE ABSCESS IN THE HEAD/NECK REGION thus I consider the discharge disposition reasonable. Also, there is no evidence or peritonitis, sepsis, or toxicity. Prudy Blinks and I have discussed the diagnosis and risks, and we agree with discharging home to follow-up with ENT. We also discussed returning to the Emergency Department immediately if new or worsening symptoms occur.  We have discussed the symptoms which are most concerning (e.g., changing or worsening pain, hearing changes, neck stiffness or fever) that necessitate immediate return. Clinical Impression:  1. Infective otitis externa of left ear          Disposition:  Discharge to home in good condition. Blood pressure (!) 142/95, pulse 85, temperature 98 °F (36.7 °C), temperature source Tympanic, resp. rate 18, weight 274 lb 7.6 oz (124.5 kg), SpO2 98 %, not currently breastfeeding. Patient was given scripts for the following medications. I counseled patient how to take these medications. Discharge Medication List as of 5/29/2022  1:40 AM      START taking these medications    Details   ciprofloxacin-dexamethasone (CIPRODEX) 0.3-0.1 % otic suspension Place 4 drops into the left ear 2 times daily for 7 days, Disp-1 each, R-0Print             Disposition referral (if applicable):  [facility]) [facility]  [phone]  Schedule an appointment as soon as possible for a visit in 3 days           This chart was generated in part by using Dragon Dictation system and may contain errors related to that system including errors in grammar, punctuation, and spelling, as well as words and phrases that may be inappropriate. If there are any questions or concerns please feel free to contact the dictating provider for clarification.      Riya Barr MD  [facility] MD Chip  05/29/22 2071

## 2022-10-29 ENCOUNTER — HOSPITAL ENCOUNTER (EMERGENCY)
Age: 32
Discharge: HOME | End: 2022-10-29
Attending: EMERGENCY MEDICINE
Payer: COMMERCIAL

## 2022-10-29 ENCOUNTER — APPOINTMENT (OUTPATIENT)
Dept: GENERAL RADIOLOGY | Age: 32
End: 2022-10-29
Payer: COMMERCIAL

## 2022-10-29 VITALS
RESPIRATION RATE: 16 BRPM | BODY MASS INDEX: 46.65 KG/M2 | WEIGHT: 279.98 LBS | HEIGHT: 65 IN | SYSTOLIC BLOOD PRESSURE: 113 MMHG | TEMPERATURE: 98.1 F | OXYGEN SATURATION: 98 % | HEART RATE: 88 BPM | DIASTOLIC BLOOD PRESSURE: 79 MMHG

## 2022-10-29 DIAGNOSIS — S62.609A CLOSED FRACTURE OF PHALANX OF DIGIT OF HAND, INITIAL ENCOUNTER: Primary | ICD-10-CM

## 2022-10-29 PROCEDURE — 73130 X-RAY EXAM OF HAND: CPT

## 2022-10-29 PROCEDURE — 99283 EMERGENCY DEPT VISIT LOW MDM: CPT

## 2022-10-29 RX ORDER — IBUPROFEN 400 MG/1
400 TABLET ORAL EVERY 6 HOURS PRN
Qty: 30 TABLET | Refills: 0 | Status: SHIPPED | OUTPATIENT
Start: 2022-10-29

## 2022-10-29 ASSESSMENT — PAIN - FUNCTIONAL ASSESSMENT: PAIN_FUNCTIONAL_ASSESSMENT: 0-10

## 2022-10-29 ASSESSMENT — PAIN DESCRIPTION - DESCRIPTORS: DESCRIPTORS: ACHING

## 2022-10-29 ASSESSMENT — PAIN DESCRIPTION - LOCATION: LOCATION: FINGER (COMMENT WHICH ONE)

## 2022-10-29 ASSESSMENT — PAIN SCALES - GENERAL: PAINLEVEL_OUTOF10: 3

## 2022-10-29 ASSESSMENT — PAIN DESCRIPTION - PAIN TYPE: TYPE: ACUTE PAIN

## 2022-10-29 ASSESSMENT — PAIN DESCRIPTION - ORIENTATION: ORIENTATION: LEFT

## 2022-10-29 ASSESSMENT — PAIN DESCRIPTION - FREQUENCY: FREQUENCY: CONTINUOUS

## 2022-10-29 NOTE — ED PROVIDER NOTES
CHIEF COMPLAINT  Chief Complaint   Patient presents with    Finger Injury     Benny Camacho going up steps 4 days ago. Injury to left index finger     Rash     Rash right ankle        HISTORY OF PRESENT ILLNESS  Veronika Weston is a 28 y.o. female who presents to the ED complaining of 3-day history of pain of the left fourth finger after she fell going up some steps complaining of pain over the base of the middle phalanx. Patient denies any loss of flexor or extensor tendon injury of the fourth digit at the PIP, DIP and MCP joint. No paresthesia. No other complaints, modifying factors or associated symptoms. Nursing notes reviewed. Past Medical History:   Diagnosis Date    Bacterial vaginosis 2015    Chronic kidney disease     reoccuring UTI and BV with preg.   Pylo 16    HSV-2 (herpes simplex virus 2) infection     last outbreak  did not take Valtrex at that time  taking Valtrex now    Postpartum depression     no meds     Past Surgical History:   Procedure Laterality Date     SECTION      TONSILLECTOMY      WISDOM TOOTH EXTRACTION       Family History   Problem Relation Age of Onset    Heart Disease Maternal Grandfather     Substance Abuse Maternal Grandfather         alcoholic     Heart Disease Paternal Grandfather     High Cholesterol Mother     High Blood Pressure Mother     Depression Mother     Diabetes Father     High Cholesterol Father     Arthritis Sister     Cancer Maternal Grandmother     Heart Disease Maternal Grandmother     High Blood Pressure Maternal Grandmother     Diabetes Paternal Grandmother     Heart Disease Paternal Grandmother         50's heart attack     Social History     Socioeconomic History    Marital status: Single     Spouse name: Not on file    Number of children: Not on file    Years of education: Not on file    Highest education level: Not on file   Occupational History    Not on file   Tobacco Use    Smoking status: Former     Packs/day: 0.50 Types: Cigarettes    Smokeless tobacco: Never    Tobacco comments:     pt states she quit 2 years ago   Substance and Sexual Activity    Alcohol use: No    Drug use: No    Sexual activity: Yes     Partners: Male   Other Topics Concern    Not on file   Social History Narrative    Not on file     Social Determinants of Health     Financial Resource Strain: Not on file   Food Insecurity: Not on file   Transportation Needs: Not on file   Physical Activity: Not on file   Stress: Not on file   Social Connections: Not on file   Intimate Partner Violence: Not on file   Housing Stability: Not on file     No current facility-administered medications for this encounter. Current Outpatient Medications   Medication Sig Dispense Refill    ibuprofen (IBU) 400 MG tablet Take 1 tablet by mouth every 6 hours as needed for Pain 30 tablet 0    Levonorgestrel (MIRENA, 52 MG, IU) by Intrauterine route       Allergies   Allergen Reactions    Norco [Hydrocodone-Acetaminophen]        REVIEW OF SYSTEMS  Positives and pertinent negatives as per HPI. Six other systems were reviewed and are negative. Nursing notes pertaining to ROS were reviewed. PHYSICAL EXAM   /79   Pulse 88   Temp 98.1 °F (36.7 °C) (Tympanic)   Resp 16   Ht 5' 5\" (1.651 m)   Wt 279 lb 15.8 oz (127 kg)   SpO2 98%   BMI 46.59 kg/m²   GENERAL APPEARANCE: Awake and alert. Cooperative. No acute distress. EXTREMITIES: Left hand reveals no tenderness palpation over the wrist, carpals or metacarpals. Patient has intact flexor and extensor tendon function at the MCP, PIP and DIP joints of the left fourth digit with normal sensation to light touch. Good cap refill. Patient has point tenderness and swelling overlying the dorsal aspect of the PIP joint at the base of the middle phalanx. SKIN: Warm and dry. NEUROLOGICAL: Alert and oriented.      RADIOLOGY    XR HAND LEFT (MIN 3 VIEWS)   Final Result   Findings are concerning for fracture at the base of the middle phalanx of the   4th digit, at the 4th PIP joint. Correlate with point tenderness. ED COURSE/MDM  Fracture at the base of the middle phalanx of the fourth digit without evidence of neurovascular or musculotendinous injury. AlumaFoam splint, RICE, ibuprofen with follow-up with hand surgery within the next week. Patient was given scripts for the following medications. I counseled patient how to take these medications. New Prescriptions    IBUPROFEN (IBU) 400 MG TABLET    Take 1 tablet by mouth every 6 hours as needed for Pain         CLINICAL IMPRESSION  1. Closed fracture of phalanx of digit of hand, initial encounter        Blood pressure 113/79, pulse 88, temperature 98.1 °F (36.7 °C), temperature source Tympanic, resp. rate 16, height 5' 5\" (1.651 m), weight 279 lb 15.8 oz (127 kg), SpO2 98 %, not currently breastfeeding.       Follow-up with:  Sophia Peterson MD  [address] [place] [address]    In 1 week            Johanna Harrison MD  10/29/22 4330       Johanna Harrison MD  10/29/22 2999

## 2022-10-31 ENCOUNTER — TELEPHONE (OUTPATIENT)
Dept: ORTHOPEDIC SURGERY | Age: 32
End: 2022-10-31

## 2022-10-31 NOTE — TELEPHONE ENCOUNTER
Left toribio for the  patient to call back to schedule with April Velasquez on Friday morning at [place].

## 2022-10-31 NOTE — TELEPHONE ENCOUNTER
Appointment Request     Patient requesting earlier appointment: Yes  Appointment offered to patient: N/A  Patient Contact Number: [phone]  ANDREW SUAREZ /AMBER HOBBS

## 2022-10-31 NOTE — TELEPHONE ENCOUNTER
General Question     Subject: APPT  Patient and /or Facility Request: Sampson Pace  Contact Number: [phone]      PATIENT RETURNING  A CALL FROM THE OFFICE TO BE SEEN BY JD Crook ON Friday. Layton Jung PLEASE CALL PATIENT BACK AT THE ABOVE NUMBER. ..

## 2022-11-04 ENCOUNTER — OFFICE VISIT (OUTPATIENT)
Dept: ORTHOPEDIC SURGERY | Age: 32
End: 2022-11-04

## 2022-11-04 VITALS — RESPIRATION RATE: 16 BRPM | HEIGHT: 65 IN | BODY MASS INDEX: 46.48 KG/M2 | WEIGHT: 279 LBS

## 2022-11-04 DIAGNOSIS — T14.8XXA AVULSION FRACTURE: Primary | ICD-10-CM

## 2022-11-04 NOTE — PROGRESS NOTES
Ms. Darryle Public is a 28 y.o. right handed woman  who is seen today in Hand Surgical Consultation at the request of [facility]. She is seen today regarding an injury occurring on October 26th, 2022. She reports injuring her left Ring Finger, having Fallen on the stairs at Home. At the time of injury, there was not malposition of the finger. She was seen for evaluation elsewhere, radiographs were obtained & she has been immobilized. She reports moderate pain located in the distal aspect of the Ring Finger, no tenderness of the remaining hand, wrist, or elbow. She notes today, no neurologic symptoms in the Whole Hand. Symptoms show no change over time. I have today reviewed with Darryle Public the clinically relevant, past medical history, medications, allergies,  family history, social history, and Review Of Systems & I have documented any details relevant to today's presenting complaints in my history above. Ms. Krystyna Garner's self-reported past medical history, medications, allergies,  family history, social history, and Review Of Systems have been scanned into the chart under the \"Media\" tab. Physical Exam:  Ms. Josef Hare most recent vitals:  Vitals  Resp: 16  Height: 5' 5\" (165.1 cm)  Weight: 279 lb (126.6 kg)    She is well nourished, oriented to person, place & time. She demonstrates appropriate mood and affect as well as normal gait and station.     Skin: Normal in appearance, Normal Color, and Free of Lesions Bilaterally   Digital range of motion is normal bilaterally except in the Ring Finger where the PIP joint shows limited active extension and flexion limited due to pain and stiff from immobilization,  Wrist range of motion is equal bilaterally   Sensation is subjectively normal in the Whole Hand, objectively present in the same distribution bilaterally  Vascular examination reveals good capillary refill and good color bilaterally  There is minimal acute ecchymosis at the site of injury, similar finding is not seen elsewhere bilaterally  Swelling is minimal in the Ring Finger, centered about the Proximal interphalangeal joint. No swelling of any other digit, bilaterally. There is no evidence of gross joint instability bilaterally. Muscular strength is clinically appropriate bilaterally. There is Moderate pain elicited with palpation of the injured PIP Joint. The base of the hand & wrist are not tender to palpation. Radiographic Evaluation:  Radiographs, taken From a Hospital location outside of my practice were Personally Reviewed & Interpreted by myself today (2 views of the left Ring Finger). They demonstrate evidence of acute fracture of the proximal phalanx with no subluxation of the PIP joint. There is no evidence of degenerative osteoarthritis of the small joints of the fingers. There is not evidence of other injury or bony fracture. Impression:  Ms. Jermaine Fritz has sustained recent Mallet Finger injury with associated fracture and presents requesting further treatment. Plan:  I have discussed with Ms. Jermaine Fritz the various treatment options for treatment of her left Ring Finger Proximal Phalanx avulsion fracture. We discussed the options of Conservative management of the avulsion fracture (accepting its current position and the functional consequences thereof), continuous splinting of the distal interphalangeal joint in hyperextension (and the limitations which go along with finger immobilization). She has elected to proceed conservatively, voicing an understanding of the other options available to her. I have explained the complications, limitations, expectations, alternatives, & risks of her chosen treatment.   We discussed the possibility of residual symptoms as may be related to conservative treatment of fractures including the possibilities of: mal-union, non-union, delayed union, persistent deformity, persistent pain, limitation of motion, future arthritic symptoms & the possible need for further treatment. She understood our discussion and was comfortable with her decision; she was provided with appropriate expectations. She is today fitted with a carefully applied digital splint, maintaining the PIP joint in flexion. The splint was secured to the finger. Ms. Meg Wilkins. Is instructed in the continuous wear of the splint 24 hours a day without its removal for a period of 8 weeks, though she will follow up in the next 7-10 days. She is instructed to contact the office if she is unable to keep the splint in place or if it becomes dislodged, malpositioned, or otherwise unserviceable. I have asked her to schedule a follow-up appointment for 1 week from now at which time we will evaluate her healing. She is specifically instructed to contact the office between now & her scheduled appointment if she has concerns related to the splint or the underlying fracture. She is welcome to call for an appointment sooner if she has any additional concerns or questions. I have also discussed with Ms. Meg Wilkins  the other treatment options available to her  for this condition. We have today selected to proceed with conservative management. She and I have agreed that if our current course of conservative treatment does not prove to be effective over the short term future, that she will schedule a follow-up appointment to discuss and select an alternate course of therapy including possibly injection or surgical treatment. Ms. Meg Wilkins has been given a full verbal list of instructions and precautions related to her present condition. I have asked her to followup with me in the office at the prescribed time. She is also specifically requested to call or return to the office sooner if her symptoms change or worsen prior to the next scheduled appointment.

## 2022-11-11 ENCOUNTER — OFFICE VISIT (OUTPATIENT)
Dept: ORTHOPEDIC SURGERY | Age: 32
End: 2022-11-11
Payer: COMMERCIAL

## 2022-11-11 VITALS — BODY MASS INDEX: 46.48 KG/M2 | RESPIRATION RATE: 16 BRPM | WEIGHT: 279 LBS | HEIGHT: 65 IN

## 2022-11-11 DIAGNOSIS — T14.8XXA AVULSION FRACTURE: Primary | ICD-10-CM

## 2022-11-11 PROCEDURE — 26740 TREAT FINGER FRACTURE EACH: CPT | Performed by: PHYSICIAN ASSISTANT

## 2022-11-11 NOTE — PROGRESS NOTES
Ms. Darryle Public returns today in follow-up of her recent left Ring Finger Proximal Interphalangeal Joint Radial and Ulnar collateral ligament injury with middle phalanx fracture which occurred approximately 2 weeks ago. She has been treated with immobilization for protection. She has noted decreased discomfort and decreased swelling. She notes no symptoms of numbness, tingling, no symptoms related to perfusion. I have today reviewed with Darryle Public the clinically relevant, past medical history, medications, allergies,  family history, social history, and Review Of Systems & I have documented any details relevant to today's presenting complaints in my history above. Ms. Krystyna Garner's self-reported past medical history, medications, allergies,  family history, social history, and Review Of Systems have been scanned into the chart under the \"Media\" tab. Physical Exam:  Vitals  Resp: 16  Height: 5' 5\" (165.1 cm)  Weight: 279 lb (126.6 kg)  Ms. Darryle Public appears well, she is in no apparent distress, she demonstrates appropriate mood & affect. Skin: Normal in appearance, Normal Color, and Free of Lesions Bilaterally   Digits: stiff from immobilization on the Left, normal on the Right. Thumb shows full range of motion bilaterally  Wrist range of motion is full bilaterally  There is no evidence of gross joint instability bilaterally. Sensation is normal bilaterally  Vascular examination reveals normal, good capillary refill, and good color bilaterally  Swelling is mild Ring Finger on the Left, normal on the Right  There is no laxity to a gentle stress test of the injured left Ring Finger Proximal Interphalangeal Joint Radial and Ulnar collateral ligament. Ligament injury site is mildly tender to palpation. Muscular strength is clinically appropriate bilaterally. Radiographic Evaluation:  Radiographs were not obtained today.     Impression:  Ms. Darryle Public is doing well after recent left Ring Finger Proximal Interphalangeal Joint Radial and Ulnar collateral ligament injury and middle finger phalanx fracture . It would appear that she has partially healed her injury. Plan:  Ms. Julieta Argueta is instructed in the appropriate short term protection of her healing ligament injury. We discussed the use of either a removable protective orthosis or activity adjustments as the situation demands. She is demonstrated the application and use of both devices. She is also instructed in work on Active & Passive range of motion of the digits, wrist, & elbow. These modalities were demonstrated to her today. We discussed the continued restrictions on the use of the injured hand and the limitations on resumption of activities until full range of motion and comfort have been regained. I have explained the time course and likely expectations for maximal recovery of motion and function. We discussed the option of pursuing formalized hand therapy and a prescription  was not indicated. I have asked Ms. Julieta Argueta to follow-up with me by either scheduling an appointment for 4 weeks from now or by calling me at that time if she has not been able to regain full painless range of motion and functional use of the injured extremity. She is also specifically instructed to return to the office or call for an appointment sooner if her symptoms are changing or worsening prior to that time.

## 2025-01-08 ENCOUNTER — HOSPITAL ENCOUNTER (EMERGENCY)
Age: 35
Discharge: HOME | End: 2025-01-08
Attending: EMERGENCY MEDICINE
Payer: COMMERCIAL

## 2025-01-08 ENCOUNTER — APPOINTMENT (OUTPATIENT)
Dept: GENERAL RADIOLOGY | Age: 35
End: 2025-01-08
Payer: COMMERCIAL

## 2025-01-08 VITALS
TEMPERATURE: 100.2 F | HEIGHT: 65 IN | OXYGEN SATURATION: 96 % | BODY MASS INDEX: 45.4 KG/M2 | HEART RATE: 101 BPM | RESPIRATION RATE: 17 BRPM | WEIGHT: 272.49 LBS | SYSTOLIC BLOOD PRESSURE: 126 MMHG | DIASTOLIC BLOOD PRESSURE: 80 MMHG

## 2025-01-08 DIAGNOSIS — J18.9 PNEUMONIA OF LEFT UPPER LOBE DUE TO INFECTIOUS ORGANISM: Primary | ICD-10-CM

## 2025-01-08 LAB
ALBUMIN SERPL-MCNC: 4 G/DL (ref 3.4–5)
ALBUMIN/GLOB SERPL: 1.2 {RATIO} (ref 1.1–2.2)
ALP SERPL-CCNC: 95 U/L (ref 40–129)
ALT SERPL-CCNC: 13 U/L (ref 10–40)
ANION GAP SERPL CALCULATED.3IONS-SCNC: 11 MMOL/L (ref 3–16)
AST SERPL-CCNC: 20 U/L (ref 15–37)
BACTERIA URNS QL MICRO: ABNORMAL /HPF
BASOPHILS # BLD: 0 K/UL (ref 0–0.2)
BASOPHILS NFR BLD: 0.3 %
BILIRUB SERPL-MCNC: 0.4 MG/DL (ref 0–1)
BILIRUB UR QL STRIP.AUTO: NEGATIVE
BUN SERPL-MCNC: 9 MG/DL (ref 7–20)
CALCIUM SERPL-MCNC: 9 MG/DL (ref 8.3–10.6)
CHLORIDE SERPL-SCNC: 101 MMOL/L (ref 99–110)
CLARITY UR: CLEAR
CO2 SERPL-SCNC: 23 MMOL/L (ref 21–32)
COLOR UR: YELLOW
CREAT SERPL-MCNC: 0.7 MG/DL (ref 0.6–1.1)
D-DIMER QUANTITATIVE: 0.47 UG/ML FEU (ref 0–0.6)
DEPRECATED RDW RBC AUTO: 12.2 % (ref 12.4–15.4)
EOSINOPHIL # BLD: 0.1 K/UL (ref 0–0.6)
EOSINOPHIL NFR BLD: 1.6 %
EPI CELLS #/AREA URNS HPF: ABNORMAL /HPF (ref 0–5)
FLUAV RNA UPPER RESP QL NAA+PROBE: NEGATIVE
FLUBV AG NPH QL: NEGATIVE
GFR SERPLBLD CREATININE-BSD FMLA CKD-EPI: >90 ML/MIN/{1.73_M2}
GLUCOSE SERPL-MCNC: 95 MG/DL (ref 70–99)
GLUCOSE UR STRIP.AUTO-MCNC: NEGATIVE MG/DL
HCG SERPL QL: NEGATIVE
HCT VFR BLD AUTO: 42.2 % (ref 36–48)
HGB BLD-MCNC: 14 G/DL (ref 12–16)
HGB UR QL STRIP.AUTO: ABNORMAL
IMM GRANULOCYTES # BLD: 0 K/UL (ref 0–0.2)
IMM GRANULOCYTES NFR BLD: 0.2 %
KETONES UR STRIP.AUTO-MCNC: NEGATIVE MG/DL
LEUKOCYTE ESTERASE UR QL STRIP.AUTO: NEGATIVE
LYMPHOCYTES # BLD: 1.5 K/UL (ref 1–5.1)
LYMPHOCYTES NFR BLD: 23.9 %
MCH RBC QN AUTO: 28.5 PG (ref 26–34)
MCHC RBC AUTO-ENTMCNC: 33.2 G/DL (ref 32–36.4)
MCV RBC AUTO: 85.9 FL (ref 80–100)
MONOCYTES # BLD: 0.5 K/UL (ref 0–1.3)
MONOCYTES NFR BLD: 7.8 %
MUCOUS THREADS #/AREA URNS LPF: ABNORMAL /LPF
NEUTROPHILS # BLD: 4.2 K/UL (ref 1.7–7.7)
NEUTROPHILS NFR BLD: 66.2 %
NITRITE UR QL STRIP.AUTO: NEGATIVE
PH UR STRIP.AUTO: 5.5 [PH] (ref 5–8)
PLATELET # BLD AUTO: 228 K/UL (ref 135–450)
PMV BLD AUTO: 10.9 FL (ref 5–10.5)
POTASSIUM SERPL-SCNC: 4 MMOL/L (ref 3.5–5.1)
PROT SERPL-MCNC: 7.4 G/DL (ref 6.4–8.2)
PROT UR STRIP.AUTO-MCNC: NEGATIVE MG/DL
RBC # BLD AUTO: 4.91 M/UL (ref 4–5.2)
RBC #/AREA URNS HPF: ABNORMAL /HPF (ref 0–4)
SARS-COV-2 RDRP RESP QL NAA+PROBE: NOT DETECTED
SODIUM SERPL-SCNC: 135 MMOL/L (ref 136–145)
SP GR UR STRIP.AUTO: 1.02 (ref 1–1.03)
TRICHOMONAS #/AREA URNS HPF: ABNORMAL /HPF
TROPONIN, HIGH SENSITIVITY: <6 NG/L (ref 0–14)
TROPONIN, HIGH SENSITIVITY: <6 NG/L (ref 0–14)
UA COMPLETE W REFLEX CULTURE PNL UR: ABNORMAL
UA DIPSTICK W REFLEX MICRO PNL UR: YES
URN SPEC COLLECT METH UR: ABNORMAL
UROBILINOGEN UR STRIP-ACNC: 0.2 E.U./DL
WBC # BLD AUTO: 6.4 K/UL (ref 4–11)
WBC #/AREA URNS HPF: ABNORMAL /HPF (ref 0–5)

## 2025-01-08 PROCEDURE — 99285 EMERGENCY DEPT VISIT HI MDM: CPT

## 2025-01-08 PROCEDURE — 84484 ASSAY OF TROPONIN QUANT: CPT

## 2025-01-08 PROCEDURE — 6370000000 HC RX 637 (ALT 250 FOR IP): Performed by: EMERGENCY MEDICINE

## 2025-01-08 PROCEDURE — 80053 COMPREHEN METABOLIC PANEL: CPT

## 2025-01-08 PROCEDURE — 2500000003 HC RX 250 WO HCPCS: Performed by: EMERGENCY MEDICINE

## 2025-01-08 PROCEDURE — 81001 URINALYSIS AUTO W/SCOPE: CPT

## 2025-01-08 PROCEDURE — 36415 COLL VENOUS BLD VENIPUNCTURE: CPT

## 2025-01-08 PROCEDURE — 85379 FIBRIN DEGRADATION QUANT: CPT

## 2025-01-08 PROCEDURE — 71046 X-RAY EXAM CHEST 2 VIEWS: CPT

## 2025-01-08 PROCEDURE — 84703 CHORIONIC GONADOTROPIN ASSAY: CPT

## 2025-01-08 PROCEDURE — 87804 INFLUENZA ASSAY W/OPTIC: CPT

## 2025-01-08 PROCEDURE — 85025 COMPLETE CBC W/AUTO DIFF WBC: CPT

## 2025-01-08 PROCEDURE — 93005 ELECTROCARDIOGRAM TRACING: CPT | Performed by: EMERGENCY MEDICINE

## 2025-01-08 PROCEDURE — 96374 THER/PROPH/DIAG INJ IV PUSH: CPT

## 2025-01-08 PROCEDURE — 87635 SARS-COV-2 COVID-19 AMP PRB: CPT

## 2025-01-08 PROCEDURE — 6360000002 HC RX W HCPCS: Performed by: EMERGENCY MEDICINE

## 2025-01-08 RX ORDER — IPRATROPIUM BROMIDE AND ALBUTEROL SULFATE 2.5; .5 MG/3ML; MG/3ML
1 SOLUTION RESPIRATORY (INHALATION) ONCE
Status: COMPLETED | OUTPATIENT
Start: 2025-01-08 | End: 2025-01-08

## 2025-01-08 RX ORDER — ACETAMINOPHEN 500 MG
1000 TABLET ORAL ONCE
Status: COMPLETED | OUTPATIENT
Start: 2025-01-08 | End: 2025-01-08

## 2025-01-08 RX ORDER — BENZONATATE 100 MG/1
100 CAPSULE ORAL 3 TIMES DAILY PRN
Qty: 21 CAPSULE | Refills: 0 | Status: SHIPPED | OUTPATIENT
Start: 2025-01-08 | End: 2025-01-15

## 2025-01-08 RX ORDER — CEFDINIR 300 MG/1
300 CAPSULE ORAL 2 TIMES DAILY
Qty: 20 CAPSULE | Refills: 0 | Status: SHIPPED | OUTPATIENT
Start: 2025-01-08 | End: 2025-01-18

## 2025-01-08 RX ORDER — AZITHROMYCIN 250 MG/1
TABLET, FILM COATED ORAL
Qty: 1 PACKET | Refills: 0 | Status: SHIPPED | OUTPATIENT
Start: 2025-01-08 | End: 2025-01-12

## 2025-01-08 RX ORDER — ALBUTEROL SULFATE 90 UG/1
2 INHALANT RESPIRATORY (INHALATION) 4 TIMES DAILY PRN
Qty: 18 G | Refills: 0 | Status: SHIPPED | OUTPATIENT
Start: 2025-01-08

## 2025-01-08 RX ADMIN — IPRATROPIUM BROMIDE AND ALBUTEROL SULFATE 1 DOSE: 2.5; .5 SOLUTION RESPIRATORY (INHALATION) at 18:06

## 2025-01-08 RX ADMIN — ACETAMINOPHEN 1000 MG: 500 TABLET ORAL at 19:28

## 2025-01-08 RX ADMIN — WATER 1000 MG: 1 INJECTION INTRAMUSCULAR; INTRAVENOUS; SUBCUTANEOUS at 18:43

## 2025-01-08 ASSESSMENT — PAIN DESCRIPTION - ORIENTATION: ORIENTATION: POSTERIOR

## 2025-01-08 ASSESSMENT — LIFESTYLE VARIABLES
HOW OFTEN DO YOU HAVE A DRINK CONTAINING ALCOHOL: MONTHLY OR LESS
HOW MANY STANDARD DRINKS CONTAINING ALCOHOL DO YOU HAVE ON A TYPICAL DAY: 1 OR 2

## 2025-01-08 ASSESSMENT — PAIN - FUNCTIONAL ASSESSMENT
PAIN_FUNCTIONAL_ASSESSMENT: PREVENTS OR INTERFERES SOME ACTIVE ACTIVITIES AND ADLS
PAIN_FUNCTIONAL_ASSESSMENT: 0-10

## 2025-01-08 ASSESSMENT — PAIN DESCRIPTION - DESCRIPTORS
DESCRIPTORS: ACHING
DESCRIPTORS: ACHING;PRESSURE

## 2025-01-08 ASSESSMENT — PAIN SCALES - GENERAL
PAINLEVEL_OUTOF10: 6
PAINLEVEL_OUTOF10: 5

## 2025-01-08 ASSESSMENT — PAIN DESCRIPTION - LOCATION
LOCATION: BACK;CHEST
LOCATION: GENERALIZED

## 2025-01-08 ASSESSMENT — PAIN DESCRIPTION - FREQUENCY: FREQUENCY: CONTINUOUS

## 2025-01-08 ASSESSMENT — PAIN DESCRIPTION - PAIN TYPE: TYPE: ACUTE PAIN

## 2025-01-08 NOTE — DISCHARGE INSTRUCTIONS
Drink plenty of fluids.  Follow-up with a primary care physician in 1 to 2 days for reexamination.  Call today for an appointment.  If condition worsens or new symptoms develop, return immediately to the emergency department.     Watch for any difficulty breathing or worsening symptoms.

## 2025-01-08 NOTE — ED TRIAGE NOTES
Patient arrived to the ED ambulatory from home w/ family w/ complaints of cough.     Patient/EMS reports states Reports a cough since Sunday evening w/ production. Report she feels short of breath all the time but worse w/ the cough; reports no fever at home but cold chills into sweats at night. Reports decrease appetite w/ nausea and reporting she is losing weight. Normally around 270lb but now 261lb at home. Denies sick contact. Chest tightness w/ deep breath and cough. Now having loose stool x2 that started today    EDMD at bedside assessing pt     Patient A&O x 4, VSS w/ exception of slight HTN,

## 2025-01-08 NOTE — ED PROVIDER NOTES
interpretation there is haziness in the left upper lobe consistent with infiltrate/pneumonia.    She was given Rocephin 1 g IV.    She was feeling much improved after the nebulizer treatment.  Oxygen saturation is 98% on room air.  No evidence of respiratory distress.    She is hemodynamically stable.  There is no evidence of hypoxia.  She is improved after nebulizer treatment.    She will be given a prescription for Omnicef and Zithromax, Tessalon, and albuterol inhaler.    Drink plenty of fluids.  Follow-up with a primary care physician in 1 to 2 days for reexamination.  Call today for an appointment.  If condition worsens or new symptoms develop, return immediately to the emergency department.     I am the primary attending of record.    CRITICAL CARE TIME   Total Critical Care time was 0 minutes, excluding separately reportable procedures.  There was a high probability of clinically significant/life threatening deterioration in the patient's condition which required my urgent intervention.      CONSULTS:  None    PROCEDURES:  Unless otherwise noted below, none     Procedures      FINAL IMPRESSION      1. Pneumonia of left upper lobe due to infectious organism          DISPOSITION/PLAN   DISPOSITION Decision To Discharge 01/08/2025 06:53:30 PM      PATIENT REFERRED TO:  [facility]  [place] [address]  [phone]    Call today        DISCHARGE MEDICATIONS:  New Prescriptions    ALBUTEROL SULFATE HFA (VENTOLIN HFA) 108 (90 BASE) MCG/ACT INHALER    Inhale 2 puffs into the lungs 4 times daily as needed for Wheezing    AZITHROMYCIN (ZITHROMAX Z-ABI) 250 MG TABLET    Take 2 tablets (500 mg) on Day 1, and then take 1 tablet (250 mg) on days 2 through 5.    BENZONATATE (TESSALON PERLES) 100 MG CAPSULE    Take 1 capsule by mouth 3 times daily as needed for Cough    CEFDINIR (OMNICEF) 300 MG CAPSULE    Take 1 capsule by mouth 2 times daily for 10 days

## 2025-01-09 LAB
EKG ATRIAL RATE: 98 BPM
EKG DIAGNOSIS: NORMAL
EKG P AXIS: 39 DEGREES
EKG P-R INTERVAL: 118 MS
EKG Q-T INTERVAL: 316 MS
EKG QRS DURATION: 70 MS
EKG QTC CALCULATION (BAZETT): 403 MS
EKG R AXIS: 6 DEGREES
EKG T AXIS: 21 DEGREES
EKG VENTRICULAR RATE: 98 BPM

## 2025-01-09 PROCEDURE — 93010 ELECTROCARDIOGRAM REPORT: CPT | Performed by: INTERNAL MEDICINE

## 2025-01-09 NOTE — ED NOTES
Discharge instructions reviewed with patient and family member at bedside.  All questions answered to their satisfaction.  Both verbalize understanding, deny any questions.  Patient is discharged with incentive spirometer, education provided.  Patient again verbalizes understanding.  Patient's IV is removed, catheter intact, pressure dressing applied, no noted bleeding or drainage.  Patient ambulates from ED, gait is steady, all belongings in hand, in no apparent distress at this time.

## 2025-03-26 ENCOUNTER — HOSPITAL ENCOUNTER (OUTPATIENT)
Age: 35
Discharge: HOME | End: 2025-03-26
Payer: COMMERCIAL

## 2025-03-26 ENCOUNTER — HOSPITAL ENCOUNTER (OUTPATIENT)
Dept: GENERAL RADIOLOGY | Age: 35
Discharge: HOME | End: 2025-03-26
Payer: COMMERCIAL

## 2025-03-26 DIAGNOSIS — M54.50 LUMBAR PAIN: ICD-10-CM

## 2025-03-26 PROCEDURE — 72100 X-RAY EXAM L-S SPINE 2/3 VWS: CPT

## 2025-05-05 ENCOUNTER — HOSPITAL ENCOUNTER (OUTPATIENT)
Dept: PHYSICAL THERAPY | Age: 35
Setting detail: THERAPIES SERIES
Discharge: HOME | End: 2025-05-05

## 2025-05-05 DIAGNOSIS — M54.50 LOW BACK PAIN, UNSPECIFIED BACK PAIN LATERALITY, UNSPECIFIED CHRONICITY, UNSPECIFIED WHETHER SCIATICA PRESENT: Primary | ICD-10-CM

## 2025-05-13 ENCOUNTER — HOSPITAL ENCOUNTER (OUTPATIENT)
Dept: PHYSICAL THERAPY | Age: 35
Setting detail: THERAPIES SERIES
Discharge: HOME | End: 2025-05-13
Payer: COMMERCIAL

## 2025-05-13 DIAGNOSIS — M54.50 CHRONIC LOW BACK PAIN, UNSPECIFIED BACK PAIN LATERALITY, UNSPECIFIED WHETHER SCIATICA PRESENT: Primary | ICD-10-CM

## 2025-05-13 DIAGNOSIS — G89.29 CHRONIC LOW BACK PAIN, UNSPECIFIED BACK PAIN LATERALITY, UNSPECIFIED WHETHER SCIATICA PRESENT: Primary | ICD-10-CM

## 2025-05-13 PROCEDURE — 97112 NEUROMUSCULAR REEDUCATION: CPT

## 2025-05-13 PROCEDURE — 97110 THERAPEUTIC EXERCISES: CPT

## 2025-05-13 PROCEDURE — 97161 PT EVAL LOW COMPLEX 20 MIN: CPT

## 2025-05-13 ASSESSMENT — PAIN SCALES - QUEBEC BACK PAIN DISABILITY SCALE
PUT ON SOCKS OR PANYHOSE: NOT DIFFICULT AT ALL
BEND OVER TO CLEAN THE BATHTUB: FAIRLY DIFFICULT
THROW A BALL: SOMEWHAT DIFFICULT
REACH UP TO HIGH SHELVES: MINIMALLY DIFFICULT
TOTAL SCORE: 29
MOVE A CHAIR: NOT DIFFICULT AT ALL
SIT IN A CHAIR FOR SEVERAL HOURS: NOT DIFFICULT AT ALL
MAKE YOUR BED: FAIRLY DIFFICULT
GET OUT OF BED: MINIMALLY DIFFICULT
WALK A FEW BLOCKS OR 300 TO 400M: MINIMALLY DIFFICULT
WALK SEVERAL KILOMETERS  OR MILES: SOMEWHAT DIFFICULT
CARRY TWO BAGS OF GROCERIES: SOMEWHAT DIFFICULT
TAKE FOOD OUT OF THE REFRIGERATOR: MINIMALLY DIFFICULT
LIFT AND CARRY A HEAVY SUITCASE: VERY DIFFICULT
RIDE IN A CAR: NOT DIFFICULT AT ALL
PULL OR PUSH HEAVY DOORS: NOT DIFFICULT AT ALL
CLIMB ONE FLIGHT OF STAIRS: SOMEWHAT DIFFICULT
SLEEP THROUGH THE NIGHT: NOT DIFFICULT AT ALL
TURN OVER IN BED: MINIMALLY DIFFICULT
STAND UP FOR 20 TO 30 MINUTES: MINIMALLY DIFFICULT
RUN ONE BLOCK OR 100M: UNABLE TO DO

## 2025-05-13 NOTE — PLAN OF CARE
History:  Comorbidities:  Osteoarthritis, depression  Relevant Medical History: history of UTIs,  HSV-2, postpartum depression,  section x 3 09, 16, 3/23/17, psoriasis, cdiff                                         Precautions/ Contra-indications:           Latex allergy:  NO  Pacemaker:    NO  Contraindications for Manipulation: None  Date of Surgery:   Other:    Red Flags:  None    Suicide Screening:   The patient did not verbalize a primary behavioral concern, suicidal ideation, suicidal intent, or demonstrate suicidal behaviors.    Preferred Language for Healthcare:   [x] English       [] other:    SUBJECTIVE EXAMINATION     Patient stated complaint: Patient reports when having 3rd daughter, anesthetist took 1 hour to do epidural block but it didn't work, so second anesthesthetist was able to get it to work, since then has had pain.  Patient got a new position at work requiring her to stand x 12 hours, unable to do it, saw PCP, testing, etc.  Patient's job has since changed and is now mostly sitting.  Patient reports \"I'm always in a mild pain, can feel the ache, but depending on my activities it will increase, at end of day is higher.\"  Grocery shopping, activities, household chores.  Patient has to pace self, taking a break then can resume.  Standing in place will cause burning in back.  Patient will get get some burning down L LE and will occasionally lose strength in L LE and will give way.  There doesn't seem to be a trigger.  Happens 1-2x/week.  Descending stairs is painful, will try to walk sideways.         Test used Initial score  2025   Pain Summary VAS 3-9/10    Functional questionnaire [place] Back Pain Disability Scale 29    Other:              Pain:  Pain location: lower back, L LE  Patient describes pain to be constant, intermittent, Sharp, aching, and burning  Pain decreases with: Resting  Pain increases with: Prolonged standing and Prolonged walking     Living